# Patient Record
Sex: FEMALE | Race: WHITE | NOT HISPANIC OR LATINO | Employment: OTHER | URBAN - METROPOLITAN AREA
[De-identification: names, ages, dates, MRNs, and addresses within clinical notes are randomized per-mention and may not be internally consistent; named-entity substitution may affect disease eponyms.]

---

## 2023-03-29 PROBLEM — IMO0001 SMOKING: Status: ACTIVE | Noted: 2023-03-29

## 2024-04-08 RX ORDER — BISOPROLOL FUMARATE 5 MG/1
5 TABLET, FILM COATED ORAL DAILY
Qty: 30 TABLET | Refills: 0 | Status: SHIPPED | OUTPATIENT
Start: 2024-04-08

## 2024-04-17 ENCOUNTER — HOSPITAL ENCOUNTER (OUTPATIENT)
Dept: GENERAL RADIOLOGY | Facility: HOSPITAL | Age: 54
Discharge: HOME | End: 2024-04-17
Admitting: PHYSICIAN ASSISTANT
Payer: MEDICAID

## 2024-04-17 ENCOUNTER — OFFICE VISIT (OUTPATIENT)
Dept: NEUROSURGERY | Facility: CLINIC | Age: 54
End: 2024-04-17
Payer: MEDICAID

## 2024-04-17 VITALS
HEIGHT: 63 IN | SYSTOLIC BLOOD PRESSURE: 122 MMHG | WEIGHT: 217.4 LBS | BODY MASS INDEX: 38.52 KG/M2 | TEMPERATURE: 98.2 F | DIASTOLIC BLOOD PRESSURE: 76 MMHG

## 2024-04-17 DIAGNOSIS — M47.819 FACET ARTHROPATHY: ICD-10-CM

## 2024-04-17 DIAGNOSIS — M25.551 BILATERAL HIP PAIN: ICD-10-CM

## 2024-04-17 DIAGNOSIS — M43.10 SPONDYLOLISTHESIS, ACQUIRED: ICD-10-CM

## 2024-04-17 DIAGNOSIS — M43.10 SPONDYLOLISTHESIS, ACQUIRED: Primary | ICD-10-CM

## 2024-04-17 DIAGNOSIS — M51.36 DISC DEGENERATION, LUMBAR: ICD-10-CM

## 2024-04-17 DIAGNOSIS — M25.552 BILATERAL HIP PAIN: ICD-10-CM

## 2024-04-17 PROCEDURE — 73521 X-RAY EXAM HIPS BI 2 VIEWS: CPT

## 2024-04-17 PROCEDURE — 72120 X-RAY BEND ONLY L-S SPINE: CPT

## 2024-04-17 RX ORDER — CHOLECALCIFEROL TAB 125 MCG (5000 UNIT) 125 MCG (5000 UT)
1 TAB DAILY
COMMUNITY
Start: 2024-03-26

## 2024-04-17 RX ORDER — HYDROCODONE BITARTRATE AND ACETAMINOPHEN 5; 325 MG/1; MG/1
1 TABLET ORAL EVERY 12 HOURS SCHEDULED
COMMUNITY
Start: 2024-04-01

## 2024-04-17 RX ORDER — GABAPENTIN 300 MG/1
300 CAPSULE ORAL 3 TIMES DAILY
COMMUNITY

## 2024-04-17 RX ORDER — DULOXETIN HYDROCHLORIDE 20 MG/1
20 CAPSULE, DELAYED RELEASE ORAL
COMMUNITY
Start: 2024-04-12

## 2024-04-17 RX ORDER — ALBUTEROL SULFATE 90 UG/1
AEROSOL, METERED RESPIRATORY (INHALATION)
COMMUNITY
Start: 2024-04-16

## 2024-04-17 NOTE — PROGRESS NOTES
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 Her back and leg began bothering her in 2022.  She does not recall a specific event that initiated the pain but she has been unable to work since that time. Pain initially began in the left leg but now both legs hurt.  She describes low back pain that extends down the back of the legs often to the calf and ankle. Pain has been extended up her spine lately. She describes pain as debilitating. Symptoms are worse with sitting.  She has to frequently change positions to stay comfortable.  Walking often helps.  She was evaluated by neurosurgery in [place].  She has also been evaluated at [facility] orthopedic.  She has had chiropractic therapy, gabapentin and numerous injections at [facility].  Nothing has helped.  She denies focal weakness.  No bowel or bladder difficulties.      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    Hyperlipidemia 2023    Hypertension 2022    Low back pain     Lumbosacral disc disease     Sleep apnea 2023    Upcoming appt w/  to verify         Current Outpatient Medications:     amitriptyline (ELAVIL) 50 MG tablet, Take 1 tablet by mouth every night at bedtime., Disp: , Rfl:     aspirin (Aspirin 81) 81 MG chewable tablet, Chew 1 tablet Daily., Disp: 90 tablet, Rfl: 3    bisoprolol (ZEBeta) 5 MG tablet, TAKE 1 Tablet BY MOUTH ONCE DAILY, Disp: 30 tablet, Rfl: 0    clindamycin (CLEOCIN) 300 MG capsule, TAKE TWO CAPSULE ONE hour  prior TO appointment, Disp: , Rfl:     DULoxetine (CYMBALTA) 20 MG capsule, 1 capsule., Disp: , Rfl:     escitalopram (LEXAPRO) 20 MG tablet, Take 1 tablet by mouth Daily., Disp: , Rfl:     gabapentin (NEURONTIN) 300 MG capsule, Take 1 capsule by mouth 3 (Three) Times a Day., Disp: , Rfl:     HYDROcodone-acetaminophen (NORCO) 5-325 MG per tablet, Take 1 tablet by mouth Every 12 (Twelve) Hours., Disp: , Rfl:     lisinopril (PRINIVIL,ZESTRIL) 20 MG tablet, Take 1 tablet by mouth Daily., Disp: , Rfl:     Natural Vitamin D-3 125 MCG (5000 UT) tablet, Take 1 tablet by mouth Daily., Disp: , Rfl:     nicotine (Nicoderm CQ) 14 MG/24HR patch, Place 1 patch on the skin as directed by provider Daily., Disp: 30 patch, Rfl: 6    omeprazole (priLOSEC) 40 MG capsule, Take 1 capsule by mouth Daily., Disp: 30 capsule, Rfl: 1    polyethylene glycol (MiraLax) 17 GM/SCOOP powder, Use as directed. (Patient taking differently: Take 17 g by mouth Daily. Use as directed.), Disp: 238 g, Rfl: 0    rosuvastatin (CRESTOR) 20 MG tablet, Take 1 tablet by mouth Daily., Disp: 90 tablet, Rfl: 3    Ventolin  (90 Base) MCG/ACT inhaler, , Disp: , Rfl:     Past Surgical History:   Procedure Laterality Date    CARDIAC CATHETERIZATION  02/24/2023    50% RCA. EF 60%    CONVERTED (HISTORICAL) HOLTER  11/08/2023    < 14 Days. Avg 94. . Rare PAC & PVC. One SVT    EPIDURAL BLOCK      OTHER SURGICAL HISTORY  02/24/2023    US GB- Small GS Vs Polyp    TRIGGER POINT INJECTION         Social History     Socioeconomic History    Marital status:    Tobacco Use    Smoking status: Every Day     Current packs/day: 1.00     Average packs/day: 1 pack/day for 4.2 years (4.2 ttl pk-yrs)     Types: Cigarettes     Start date: 2/20/2020    Smokeless tobacco: Never   Vaping Use    Vaping status: Never Used   Substance and Sexual Activity    Alcohol use: Never    Drug use: Never    Sexual activity: Not Currently     Partners: Male     Birth  control/protection: Hysterectomy         Review of Systems   Constitutional:  Positive for activity change, fatigue and unexpected weight change. Negative for appetite change, chills, diaphoresis and fever.   HENT:  Negative for congestion, dental problem, drooling, ear discharge, ear pain, facial swelling, hearing loss, mouth sores, nosebleeds, postnasal drip, rhinorrhea, sinus pressure, sinus pain, sneezing, sore throat, tinnitus, trouble swallowing and voice change.    Eyes:  Negative for photophobia, pain, discharge, redness, itching and visual disturbance.   Respiratory:  Positive for apnea, cough and shortness of breath. Negative for choking, chest tightness, wheezing and stridor.    Cardiovascular:  Positive for palpitations. Negative for chest pain and leg swelling.   Gastrointestinal:  Positive for constipation. Negative for abdominal distention, abdominal pain, anal bleeding, blood in stool, diarrhea, nausea, rectal pain and vomiting.   Endocrine: Negative for cold intolerance, heat intolerance, polydipsia, polyphagia and polyuria.   Genitourinary:  Negative for decreased urine volume, difficulty urinating, dyspareunia, dysuria, enuresis, flank pain, frequency, genital sores, hematuria, menstrual problem, pelvic pain, urgency, vaginal bleeding, vaginal discharge and vaginal pain.   Musculoskeletal:  Positive for back pain and gait problem. Negative for arthralgias, joint swelling, myalgias, neck pain and neck stiffness.   Skin:  Negative for color change, pallor, rash and wound.   Allergic/Immunologic: Negative for environmental allergies, food allergies and immunocompromised state.   Neurological:  Positive for headaches. Negative for dizziness, tremors, seizures, syncope, facial asymmetry, speech difficulty, weakness, light-headedness and numbness.   Hematological:  Negative for adenopathy. Does not bruise/bleed easily.   Psychiatric/Behavioral:  Positive for decreased concentration and dysphoric mood.  "Negative for agitation, behavioral problems, confusion, hallucinations, self-injury, sleep disturbance and suicidal ideas. The patient is nervous/anxious. The patient is not hyperactive.        Objective   Vital Signs: Blood pressure 122/76, temperature 98.2 °F (36.8 °C), temperature source Infrared, height 160 cm (63\"), weight 98.6 kg (217 lb 6.4 oz).  Physical Exam  Vitals and nursing note reviewed.   Constitutional:       General: She is not in acute distress.     Appearance: She is well-developed.   HENT:      Head: Normocephalic and atraumatic.   Psychiatric:         Behavior: Behavior normal.         Thought Content: Thought content normal.     Musculoskeletal:     Strength is intact in upper and lower extremities to direct testing.     Station and gait are normal.     Pain in the leg and hip with any manipulation  Neurologic:     Muscle tone is normal throughout.     Coordination is intact.     Deep tendon reflexes: 1+ and symmetrical.     Sensation is intact to light touch throughout.     Patient is oriented to person, place, and time.         Independent review of radiographic imaging: MRI lumbar spine dated 12/27/2023 was reviewed.  This demonstrates a slight anterior listhesis of L4 on 5.  There is broad-based disc bulge as well as ligamentous thickening and facet joint arthropathy with mild canal stenosis.  No significant canal stenosis or foraminal narrowing at L5-S1.  There is a cyst in the posterior elements beyond the left facet.  I reviewed MRI with Dr. Solomon    Assessment & Plan   Diagnosis:  Lumbar degenerative disc and joint disease  Mechanical back pain    Medical Decision Making: Patient symptoms are a bit in excess of what I would expect from her imaging.  I have recommended flexion-extension plain films of the lumbar spine as well as hip radiographs.  She may go ahead and begin formal physical therapy.  I will see her back to review imaging and further recommendations will be made at that " time.    Diagnoses and all orders for this visit:    1. Spondylolisthesis, acquired (Primary)  -     XR Spine Lumbar Flex & Ext; Future  -     XR hip 1 vw bilateral; Future    2. Bilateral hip pain  -     XR Spine Lumbar Flex & Ext; Future  -     XR hip 1 vw bilateral; Future    3. Disc degeneration, lumbar  -     XR Spine Lumbar Flex & Ext; Future  -     XR hip 1 vw bilateral; Future    4. Facet arthropathy  -     XR Spine Lumbar Flex & Ext; Future  -     XR hip 1 vw bilateral; Future                            Treva Carter PA-C  Patient Care Team:  Ivania Aquino APRN as PCP - General (Family Medicine)

## 2024-05-01 ENCOUNTER — OFFICE VISIT (OUTPATIENT)
Dept: NEUROSURGERY | Facility: CLINIC | Age: 54
End: 2024-05-01
Payer: MEDICAID

## 2024-05-01 VITALS
WEIGHT: 217.3 LBS | BODY MASS INDEX: 38.5 KG/M2 | HEIGHT: 63 IN | SYSTOLIC BLOOD PRESSURE: 120 MMHG | DIASTOLIC BLOOD PRESSURE: 72 MMHG | TEMPERATURE: 98 F

## 2024-05-01 DIAGNOSIS — M25.551 BILATERAL HIP PAIN: ICD-10-CM

## 2024-05-01 DIAGNOSIS — M47.819 FACET ARTHROPATHY: ICD-10-CM

## 2024-05-01 DIAGNOSIS — M25.552 BILATERAL HIP PAIN: ICD-10-CM

## 2024-05-01 DIAGNOSIS — M51.36 DISC DEGENERATION, LUMBAR: ICD-10-CM

## 2024-05-01 DIAGNOSIS — M43.10 SPONDYLOLISTHESIS, ACQUIRED: Primary | ICD-10-CM

## 2024-05-01 RX ORDER — ACYCLOVIR 800 MG/1
TABLET ORAL
COMMUNITY

## 2024-05-01 RX ORDER — BISACODYL 5 MG/1
TABLET, DELAYED RELEASE ORAL
COMMUNITY

## 2024-05-01 NOTE — PROGRESS NOTES
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  She does not recall a specific event that initiated the pain but she has been unable to work since that time. Pain initially began in the left leg but now both legs hurt.  She describes low back pain that extends down the back of the legs often to the calf and ankle. Pain has been extended up her spine lately. She describes pain as debilitating. Symptoms are worse with sitting.  She has to frequently change positions to stay comfortable.  Walking often helps.  She was evaluated by neurosurgery in [place].  She has also been evaluated at [facility] orthopedic.  She has had chiropractic therapy, gabapentin and numerous injections at [facility].  Nothing has helped.  She denies focal weakness.  No bowel or bladder difficulties.      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    Hyperlipidemia 2023    Hypertension 2022    Low back pain     Lumbosacral disc disease     Sleep apnea 2023    Upcoming appt w/  to verify         Current Outpatient Medications:     acyclovir (ZOVIRAX) 800 MG tablet, take 1 tablet by mouth once daily for 10 days, Disp: , Rfl:     amitriptyline (ELAVIL) 50 MG tablet, Take 1 tablet by mouth every night at bedtime., Disp: , Rfl:     aspirin (Aspirin 81) 81 MG chewable tablet, Chew 1 tablet Daily., Disp: 90 tablet, Rfl: 3    bisacodyl (DULCOLAX) 5 MG EC tablet, TAKE ONE TABLET AS DIRECTED,  Disp: , Rfl:     bisoprolol (ZEBeta) 5 MG tablet, TAKE 1 Tablet BY MOUTH ONCE DAILY, Disp: 30 tablet, Rfl: 0    clindamycin (CLEOCIN) 300 MG capsule, TAKE TWO CAPSULE ONE hour prior TO appointment, Disp: , Rfl:     DULoxetine (CYMBALTA) 20 MG capsule, 1 capsule., Disp: , Rfl:     escitalopram (LEXAPRO) 20 MG tablet, Take 1 tablet by mouth Daily., Disp: , Rfl:     gabapentin (NEURONTIN) 300 MG capsule, Take 1 capsule by mouth 3 (Three) Times a Day., Disp: , Rfl:     HYDROcodone-acetaminophen (NORCO) 5-325 MG per tablet, Take 1 tablet by mouth Every 12 (Twelve) Hours., Disp: , Rfl:     lisinopril (PRINIVIL,ZESTRIL) 20 MG tablet, Take 1 tablet by mouth Daily., Disp: , Rfl:     Natural Vitamin D-3 125 MCG (5000 UT) tablet, Take 1 tablet by mouth Daily., Disp: , Rfl:     nicotine (Nicoderm CQ) 14 MG/24HR patch, Place 1 patch on the skin as directed by provider Daily., Disp: 30 patch, Rfl: 6    omeprazole (priLOSEC) 40 MG capsule, Take 1 capsule by mouth Daily., Disp: 30 capsule, Rfl: 1    polyethylene glycol (MiraLax) 17 GM/SCOOP powder, Use as directed. (Patient taking differently: Take 17 g by mouth Daily. Use as directed.), Disp: 238 g, Rfl: 0    rosuvastatin (CRESTOR) 20 MG tablet, Take 1 tablet by mouth Daily., Disp: 90 tablet, Rfl: 3    Ventolin  (90 Base) MCG/ACT inhaler, , Disp: , Rfl:     Past Surgical History:   Procedure Laterality Date    CARDIAC CATHETERIZATION  02/24/2023    50% RCA. EF 60%    CONVERTED (HISTORICAL) HOLTER  11/08/2023    < 14 Days. Avg 94. . Rare PAC & PVC. One SVT    EPIDURAL BLOCK      OTHER SURGICAL HISTORY  02/24/2023    US GB- Small GS Vs Polyp    TRIGGER POINT INJECTION         Social History     Socioeconomic History    Marital status:    Tobacco Use    Smoking status: Every Day     Current packs/day: 1.00     Average packs/day: 1 pack/day for 4.2 years (4.2 ttl pk-yrs)     Types: Cigarettes     Start date: 2/20/2020    Smokeless tobacco: Never   Vaping Use     Vaping status: Never Used   Substance and Sexual Activity    Alcohol use: Never    Drug use: Never    Sexual activity: Not Currently     Partners: Male     Birth control/protection: Hysterectomy         Review of Systems   Constitutional:  Positive for activity change, fatigue and unexpected weight change. Negative for appetite change, chills, diaphoresis and fever.   HENT:  Negative for congestion, dental problem, drooling, ear discharge, ear pain, facial swelling, hearing loss, mouth sores, nosebleeds, postnasal drip, rhinorrhea, sinus pressure, sinus pain, sneezing, sore throat, tinnitus, trouble swallowing and voice change.    Eyes:  Negative for photophobia, pain, discharge, redness, itching and visual disturbance.   Respiratory:  Positive for apnea, cough and shortness of breath. Negative for choking, chest tightness, wheezing and stridor.    Cardiovascular:  Positive for palpitations. Negative for chest pain and leg swelling.   Gastrointestinal:  Positive for constipation. Negative for abdominal distention, abdominal pain, anal bleeding, blood in stool, diarrhea, nausea, rectal pain and vomiting.   Endocrine: Negative for cold intolerance, heat intolerance, polydipsia, polyphagia and polyuria.   Genitourinary:  Negative for decreased urine volume, difficulty urinating, dyspareunia, dysuria, enuresis, flank pain, frequency, genital sores, hematuria, menstrual problem, pelvic pain, urgency, vaginal bleeding, vaginal discharge and vaginal pain.   Musculoskeletal:  Positive for back pain and gait problem. Negative for arthralgias, joint swelling, myalgias, neck pain and neck stiffness.   Skin:  Negative for color change, pallor, rash and wound.   Allergic/Immunologic: Negative for environmental allergies, food allergies and immunocompromised state.   Neurological:  Positive for headaches. Negative for dizziness, tremors, seizures, syncope, facial asymmetry, speech difficulty, weakness, light-headedness and  "numbness.   Hematological:  Negative for adenopathy. Does not bruise/bleed easily.   Psychiatric/Behavioral:  Positive for decreased concentration and dysphoric mood. Negative for agitation, behavioral problems, confusion, hallucinations, self-injury, sleep disturbance and suicidal ideas. The patient is nervous/anxious. The patient is not hyperactive.        Objective   Vital Signs: Blood pressure 120/72, temperature 98 °F (36.7 °C), temperature source Infrared, height 160 cm (63\"), weight 98.6 kg (217 lb 4.8 oz).  Physical Exam  Vitals and nursing note reviewed.   Constitutional:       General: She is not in acute distress.     Appearance: She is well-developed.   HENT:      Head: Normocephalic and atraumatic.   Psychiatric:         Behavior: Behavior normal.         Thought Content: Thought content normal.     Musculoskeletal:     Strength is intact in upper and lower extremities to direct testing.     Station and gait are normal.     Pain in the leg and hip with any manipulation  Neurologic:     Muscle tone is normal throughout.     Coordination is intact.     Deep tendon reflexes: 1+ and symmetrical.     Sensation is intact to light touch throughout.     Patient is oriented to person, place, and time.         Independent review of radiographic imaging: MRI lumbar spine dated 12/27/2023 was reviewed.  This demonstrates a slight anterior listhesis of L4 on 5.  There is broad-based disc bulge as well as ligamentous thickening and facet joint arthropathy with mild canal stenosis.  No significant canal stenosis or foraminal narrowing at L5-S1.  There is a cyst in the posterior elements beyond the left facet.  I reviewed MRI with Dr. Solomon    No instability on flexion extension films.   Mild degenerative changes in the hips.    Assessment & Plan   Diagnosis:  Lumbar degenerative disc and joint disease  Mechanical back pain    Medical Decision Making: I reviewed films with Dr. Solomon.  In the absence of consistent " clear-cut radicular complaints, he does not recommend surgical intervention at this time.  Patient will need to continue with pain management.  She has had some improvement with gabapentin.  She may benefit from an increase in dose.  If symptoms change I will be happy to see her as needed      Diagnoses and all orders for this visit:    1. Spondylolisthesis, acquired (Primary)    2. Bilateral hip pain    3. Disc degeneration, lumbar    4. Facet arthropathy                              Treva Carter PA-C  Patient Care Team:  Ivania Aquino APRN as PCP - General (Family Medicine)

## 2024-05-16 ENCOUNTER — OFFICE VISIT (OUTPATIENT)
Dept: CARDIOLOGY | Facility: CLINIC | Age: 54
End: 2024-05-16
Payer: MEDICAID

## 2024-05-16 VITALS
HEART RATE: 72 BPM | BODY MASS INDEX: 39.16 KG/M2 | DIASTOLIC BLOOD PRESSURE: 68 MMHG | WEIGHT: 221 LBS | SYSTOLIC BLOOD PRESSURE: 110 MMHG | HEIGHT: 63 IN

## 2024-05-16 DIAGNOSIS — I10 PRIMARY HYPERTENSION: Primary | ICD-10-CM

## 2024-05-16 DIAGNOSIS — R00.2 PALPITATIONS: ICD-10-CM

## 2024-05-16 DIAGNOSIS — E55.9 VITAMIN D DEFICIENCY: ICD-10-CM

## 2024-05-16 DIAGNOSIS — E78.00 HYPERCHOLESTEREMIA: ICD-10-CM

## 2024-05-16 RX ORDER — ASPIRIN 81 MG/1
81 TABLET, CHEWABLE ORAL DAILY
Qty: 90 TABLET | Refills: 3 | Status: SHIPPED | OUTPATIENT
Start: 2024-05-16

## 2024-05-16 RX ORDER — ROSUVASTATIN CALCIUM 20 MG/1
20 TABLET, COATED ORAL DAILY
Qty: 90 TABLET | Refills: 3 | Status: SHIPPED | OUTPATIENT
Start: 2024-05-16

## 2024-05-16 RX ORDER — LISINOPRIL 20 MG/1
20 TABLET ORAL DAILY
Qty: 90 TABLET | Refills: 3 | Status: SHIPPED | OUTPATIENT
Start: 2024-05-16

## 2024-05-16 RX ORDER — BISOPROLOL FUMARATE 5 MG/1
2.5 TABLET, FILM COATED ORAL DAILY
Qty: 90 TABLET | Refills: 3 | Status: SHIPPED | OUTPATIENT
Start: 2024-05-16

## 2024-05-16 RX ORDER — CHOLECALCIFEROL TAB 125 MCG (5000 UNIT) 125 MCG (5000 UT)
1 TAB DAILY
Qty: 30 TABLET | Refills: 6 | Status: SHIPPED | OUTPATIENT
Start: 2024-05-16

## 2024-05-16 NOTE — PROGRESS NOTES
"Chief Complaint   Patient presents with    Follow-up     Cardiac management    Lab     Last labs in chart.    Rapid Heart Rate     Has been waking from a nap daily with heart racing. States \"feels like my heart is beating out of my chest\". She has not been wearing CPAP when she lays down during the day.     Sleep apnea      Wears CPAP at night, follows with Dr Barillas.    Medication     She was only able to tolerate Bisoprolol 5 mg 1/2 tablet daily.    Med Refill     Will need refills on cardiac medications-90 day        HPI:  GIANCARLO Barraza is a 53 y.o. female with HTN, hypercholesterolemia who was admitted in February 2023 with severe chest pain radiating to shoulder.  Troponin elevated, underwent cardiac cath found to have moderate disease of the RCA.  , started statin and aspirin, lisinopril continued.  She was diagnosed with sleep apnea, now wearing CPAP.     She returns today for follow-up.  Her Holter monitor 10/2023 which showed short run of SVT 4 beatsThe rate of 126 bpm no VT no significant pauses and was prescribed bisoprolol 5 mg daily but starting with 1/2 tablet and increasing to 1 whole tablet if tolerated.  Patient states it was not tolerated and she is stayed on 1/2 tablet daily. Patient denies chest pain, pressure, tightness, dizziness, shortness of air. She reports palpitations when she wakes from naps during the day. Feels her heart beating out of chest which she used to feel at night before starting CPAP. She no longer feels it at night and does not wear CPAP during day naps. She follows with Dr. Hughes, pulmonology. Requesting cardiac medication refills.  Last labs in chart 10/24/2023 total cholesterol 133, triglycerides 100, HDL 58, LDL 57,Normal CBC, creatinine 0.53, .8, vitamin D 19.1, TSH 3.32 magnesium 2.1, A1c 5.5.    Cardiac History:    Past Surgical History:   Procedure Laterality Date    CARDIAC CATHETERIZATION  02/24/2023    50% RCA. EF 60%    CONVERTED " (HISTORICAL) HOLTER  11/08/2023    < 14 Days. Avg 94. . Rare PAC & PVC. One SVT    EPIDURAL BLOCK      OTHER SURGICAL HISTORY  02/24/2023    US GB- Small GS Vs Polyp    TRIGGER POINT INJECTION         Current Outpatient Medications   Medication Sig Dispense Refill    acyclovir (ZOVIRAX) 800 MG tablet Take 1 tablet by mouth Daily.      amitriptyline (ELAVIL) 50 MG tablet Take 1 tablet by mouth every night at bedtime.      aspirin (Aspirin 81) 81 MG chewable tablet Chew 1 tablet Daily. 90 tablet 3    bisoprolol (ZEBeta) 5 MG tablet Take 0.5 tablets by mouth Daily. 90 tablet 3    clindamycin (CLEOCIN) 300 MG capsule TAKE TWO CAPSULE ONE hour prior TO appointment      DULoxetine (CYMBALTA) 20 MG capsule 1 capsule. Twice weekly for 3 weeks      escitalopram (LEXAPRO) 20 MG tablet Take 1 tablet by mouth Daily.      gabapentin (NEURONTIN) 300 MG capsule Take 1 capsule by mouth 3 (Three) Times a Day.      HYDROcodone-acetaminophen (NORCO) 5-325 MG per tablet Take 1 tablet by mouth Every 12 (Twelve) Hours.      lisinopril (PRINIVIL,ZESTRIL) 20 MG tablet Take 1 tablet by mouth Daily. 90 tablet 3    Natural Vitamin D-3 125 MCG (5000 UT) tablet Take 1 tablet by mouth Daily. 30 tablet 6    nicotine (Nicoderm CQ) 14 MG/24HR patch Place 1 patch on the skin as directed by provider Daily. (Patient taking differently: Place 1 patch on the skin as directed by provider Daily. As needed) 30 patch 6    omeprazole (priLOSEC) 40 MG capsule Take 1 capsule by mouth Daily. 30 capsule 1    polyethylene glycol (MiraLax) 17 GM/SCOOP powder Use as directed. (Patient taking differently: Take 17 g by mouth Daily. Use as directed.) 238 g 0    rosuvastatin (CRESTOR) 20 MG tablet Take 1 tablet by mouth Daily. 90 tablet 3    Ventolin  (90 Base) MCG/ACT inhaler 2 puffs Every 4 (Four) Hours As Needed.       No current facility-administered medications for this visit.       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    Hyperlipidemia 02/23/2023    Hypertension 12/2022    Low back pain     Lumbosacral disc disease     Sleep apnea 02/23/2023    Upcoming appt w/  to verify       Social History     Socioeconomic History    Marital status:    Tobacco Use    Smoking status: Every Day     Current packs/day: 1.00     Average packs/day: 1 pack/day for 4.3 years (4.3 ttl pk-yrs)     Types: Cigarettes     Start date: 2/20/2020    Smokeless tobacco: Never   Vaping Use    Vaping status: Never Used   Substance and Sexual Activity    Alcohol use: Never    Drug use: Never    Sexual activity: Not Currently     Partners: Male     Birth control/protection: Hysterectomy       Family History   Problem Relation Age of Onset    Hypertension Mother     Heart attack Father     Hypertension Father     Alcohol abuse Father     Diabetes Father     Heart disease Father     Other Father         Buerger’s Disease    Hypertension Maternal Grandmother     Arthritis Maternal Grandmother     Stroke Maternal Grandmother     Heart attack Maternal Grandfather     Hypertension Maternal Grandfather     Diabetes Maternal Grandfather     Heart disease Maternal Grandfather     No Known Problems Son     No Known Problems Son     No Known Problems Daughter     COPD Paternal Grandmother        Vitals:   /68 (BP Location: Right arm)   Pulse 72   Ht 160 cm (62.99\")   Wt 100 kg (221 lb)   BMI 39.16 kg/m²     Physical Exam  Vitals and nursing note reviewed.   Constitutional:       Appearance: She is obese.   Neck:      Vascular: No carotid bruit.   Cardiovascular:      Rate and Rhythm: Normal rate and regular rhythm.      Pulses: Normal pulses.      Heart sounds: Normal heart " sounds. No murmur heard.     No friction rub. No gallop.   Pulmonary:      Effort: Pulmonary effort is normal.      Breath sounds: Normal breath sounds and air entry.   Musculoskeletal:      Right lower leg: No edema.      Left lower leg: No edema.   Skin:     General: Skin is warm and dry.      Capillary Refill: Capillary refill takes less than 2 seconds.   Neurological:      Mental Status: She is alert and oriented to person, place, and time.       Procedures     Assessment & Plan     Diagnoses and all orders for this visit:    1. Primary hypertension (Primary)  -     lisinopril (PRINIVIL,ZESTRIL) 20 MG tablet; Take 1 tablet by mouth Daily.  Dispense: 90 tablet; Refill: 3  -     aspirin (Aspirin 81) 81 MG chewable tablet; Chew 1 tablet Daily.  Dispense: 90 tablet; Refill: 3    2. Hypercholesteremia  -     rosuvastatin (CRESTOR) 20 MG tablet; Take 1 tablet by mouth Daily.  Dispense: 90 tablet; Refill: 3  -     aspirin (Aspirin 81) 81 MG chewable tablet; Chew 1 tablet Daily.  Dispense: 90 tablet; Refill: 3    3. Palpitations  -     bisoprolol (ZEBeta) 5 MG tablet; Take 0.5 tablets by mouth Daily.  Dispense: 90 tablet; Refill: 3    4. Vitamin D deficiency  -     Natural Vitamin D-3 125 MCG (5000 UT) tablet; Take 1 tablet by mouth Daily.  Dispense: 30 tablet; Refill: 6    HTN  - BP well-controlled  - Continue with current medication regimen including bisoprolol 5 mg, lisinopril 20 mg    Hypercholesteremia  - Lipids controlled  - Continue current statin therapy; rosuvastatin 20 mg along with diet and exercise    Palpitations  - Recommend CPAP anytime sleeping.   - Take extra 1/2 tab bisoprolol when feel heart racing.    Vitamin D deficiency  - Last vitamin D in chart 19.1  - Continue vitamin D3 5000 units daily    No further testing at this time. Refills sent.  Discussed lifestyle modifications, nutrition. Clean whole foods. And exercise such as walking for weight loss.     Visit Diagnoses:    ICD-10-CM ICD-9-CM   1.  Primary hypertension  I10 401.9   2. Hypercholesteremia  E78.00 272.0   3. Palpitations  R00.2 785.1   4. Vitamin D deficiency  E55.9 268.9       Meds Ordered During Visit:  New Medications Ordered This Visit   Medications    bisoprolol (ZEBeta) 5 MG tablet     Sig: Take 0.5 tablets by mouth Daily.     Dispense:  90 tablet     Refill:  3     This prescription was filled on 3/26/2024. Any refills authorized will be placed on file.    lisinopril (PRINIVIL,ZESTRIL) 20 MG tablet     Sig: Take 1 tablet by mouth Daily.     Dispense:  90 tablet     Refill:  3    rosuvastatin (CRESTOR) 20 MG tablet     Sig: Take 1 tablet by mouth Daily.     Dispense:  90 tablet     Refill:  3    Natural Vitamin D-3 125 MCG (5000 UT) tablet     Sig: Take 1 tablet by mouth Daily.     Dispense:  30 tablet     Refill:  6    aspirin (Aspirin 81) 81 MG chewable tablet     Sig: Chew 1 tablet Daily.     Dispense:  90 tablet     Refill:  3       Follow Up Appointment:   Return in about 6 months (around 11/16/2024), or if symptoms worsen or fail to improve.           This document has been electronically signed by SUSAN Michael  May 22, 2024 08:29 EDT    Dictated Utilizing Dragon Dictation: Part of this note may be an electronic transcription/translation of spoken language to printed text using the Dragon Dictation System.

## 2024-05-21 ENCOUNTER — TELEPHONE (OUTPATIENT)
Dept: CARDIOLOGY | Facility: CLINIC | Age: 54
End: 2024-05-21
Payer: MEDICAID

## 2024-05-21 NOTE — TELEPHONE ENCOUNTER
Caller: [facility]    Relationship:     Best call back number: [phone] EXT [phone]    What form or medical record are you requestin2024 OFFICE NOTE    Who is requesting this form or medical record from you: JSOEPH LUU    How would you like to receive the form or medical records (pick-up, mail, fax): FAX  If fax, what is the fax number: [phone]  If mail, what is the address:   If pick-up, provide patient with address and location details    Timeframe paperwork needed:     Additional notes:

## 2024-06-25 ENCOUNTER — TELEPHONE (OUTPATIENT)
Dept: CARDIOLOGY | Facility: CLINIC | Age: 54
End: 2024-06-25
Payer: MEDICAID

## 2024-06-25 NOTE — TELEPHONE ENCOUNTER
Patient was advised at the last visit that she should have her A1C checked by her primary care but they do not want to order the test. Was wondering if we could order that test for her.  Please contact at your next convenience. Thank you in advance.

## 2024-06-26 NOTE — TELEPHONE ENCOUNTER
The last A1c I have on file is from October 2022 and it was 5.5.  This is a normal A1c.  I do not manage diabetes.  That is something that the PCP manages.  I would say they will order an A1c in the appropriate timeline according to guidelines

## 2024-06-27 NOTE — TELEPHONE ENCOUNTER
Patient was made aware to check with PCP about guidelines around ordering A1C since last was normal.

## 2024-11-25 ENCOUNTER — OFFICE VISIT (OUTPATIENT)
Dept: CARDIOLOGY | Facility: CLINIC | Age: 54
End: 2024-11-25
Payer: MEDICAID

## 2024-11-25 VITALS
HEIGHT: 63 IN | HEART RATE: 68 BPM | SYSTOLIC BLOOD PRESSURE: 130 MMHG | WEIGHT: 208 LBS | DIASTOLIC BLOOD PRESSURE: 80 MMHG | BODY MASS INDEX: 36.86 KG/M2

## 2024-11-25 DIAGNOSIS — E78.00 HYPERCHOLESTEREMIA: ICD-10-CM

## 2024-11-25 DIAGNOSIS — E88.810 METABOLIC SYNDROME: ICD-10-CM

## 2024-11-25 DIAGNOSIS — R73.01 ELEVATED FASTING GLUCOSE: ICD-10-CM

## 2024-11-25 DIAGNOSIS — E55.9 VITAMIN D DEFICIENCY: ICD-10-CM

## 2024-11-25 DIAGNOSIS — R00.2 PALPITATIONS: ICD-10-CM

## 2024-11-25 DIAGNOSIS — I10 PRIMARY HYPERTENSION: Primary | ICD-10-CM

## 2024-11-25 DIAGNOSIS — F17.200 SMOKING: ICD-10-CM

## 2024-11-25 PROCEDURE — 99214 OFFICE O/P EST MOD 30 MIN: CPT | Performed by: NURSE PRACTITIONER

## 2024-11-25 PROCEDURE — 3079F DIAST BP 80-89 MM HG: CPT | Performed by: NURSE PRACTITIONER

## 2024-11-25 PROCEDURE — 3075F SYST BP GE 130 - 139MM HG: CPT | Performed by: NURSE PRACTITIONER

## 2024-11-25 RX ORDER — POLYETHYLENE GLYCOL 3350 17 G/17G
17 POWDER, FOR SOLUTION ORAL DAILY
Start: 2024-11-25

## 2024-11-25 RX ORDER — NICOTINE 21 MG/24HR
1 PATCH, TRANSDERMAL 24 HOURS TRANSDERMAL EVERY 24 HOURS
Start: 2024-11-25

## 2024-11-25 RX ORDER — MELOXICAM 15 MG/1
15 TABLET ORAL DAILY
COMMUNITY

## 2024-11-25 RX ORDER — CHOLECALCIFEROL TAB 125 MCG (5000 UNIT) 125 MCG (5000 UT)
1 TAB DAILY
Qty: 30 TABLET | Refills: 6 | Status: SHIPPED | OUTPATIENT
Start: 2024-11-25
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
## 2025-02-24 DIAGNOSIS — F17.200 SMOKING: ICD-10-CM

## 2025-02-24 NOTE — TELEPHONE ENCOUNTER
Patient called, requesting if can have a refill on the Nicotine patch to [place] [facility].    Patient reports she will also get her labs completed you ordered at last follow up visit.

## 2025-02-26 RX ORDER — NICOTINE 21 MG/24HR
1 PATCH, TRANSDERMAL 24 HOURS TRANSDERMAL EVERY 24 HOURS
Qty: 30 PATCH | Refills: 1 | Status: SHIPPED | OUTPATIENT
Start: 2025-02-26

## 2025-04-18 DIAGNOSIS — F17.200 NICOTINE DEPENDENCE, UNSPECIFIED, UNCOMPLICATED: ICD-10-CM

## 2025-04-18 RX ORDER — NICOTINE 21 MG/24HR
PATCH, TRANSDERMAL 24 HOURS TRANSDERMAL
Qty: 30 PATCH | Refills: 1 | Status: SHIPPED | OUTPATIENT
Start: 2025-04-18

## 2025-06-02 ENCOUNTER — LAB (OUTPATIENT)
Dept: LAB | Facility: HOSPITAL | Age: 55
End: 2025-06-02
Payer: COMMERCIAL

## 2025-06-02 DIAGNOSIS — E78.00 HYPERCHOLESTEREMIA: ICD-10-CM

## 2025-06-02 DIAGNOSIS — R00.2 PALPITATIONS: ICD-10-CM

## 2025-06-02 LAB
CHOLEST SERPL-MCNC: 111 MG/DL (ref 0–200)
HDLC SERPL-MCNC: 42 MG/DL (ref 40–60)
LDLC SERPL CALC-MCNC: 46 MG/DL (ref 0–100)
LDLC/HDLC SERPL: 1.02 {RATIO}
TRIGL SERPL-MCNC: 130 MG/DL (ref 0–150)
VLDLC SERPL-MCNC: 23 MG/DL (ref 5–40)

## 2025-06-02 PROCEDURE — 36415 COLL VENOUS BLD VENIPUNCTURE: CPT

## 2025-06-02 PROCEDURE — 80061 LIPID PANEL: CPT

## 2025-06-02 RX ORDER — BISOPROLOL FUMARATE 5 MG/1
2.5 TABLET, FILM COATED ORAL DAILY
Qty: 90 TABLET | Refills: 3 | Status: SHIPPED | OUTPATIENT
Start: 2025-06-02 | End: 2025-06-05 | Stop reason: SDUPTHER

## 2025-06-04 ENCOUNTER — LAB (OUTPATIENT)
Dept: LAB | Facility: HOSPITAL | Age: 55
End: 2025-06-04
Payer: COMMERCIAL

## 2025-06-04 DIAGNOSIS — E55.9 VITAMIN D DEFICIENCY: ICD-10-CM

## 2025-06-04 DIAGNOSIS — I10 PRIMARY HYPERTENSION: ICD-10-CM

## 2025-06-04 DIAGNOSIS — R00.2 PALPITATIONS: ICD-10-CM

## 2025-06-04 DIAGNOSIS — R73.01 ELEVATED FASTING GLUCOSE: ICD-10-CM

## 2025-06-04 DIAGNOSIS — E88.810 METABOLIC SYNDROME: ICD-10-CM

## 2025-06-04 DIAGNOSIS — E78.00 HYPERCHOLESTEREMIA: ICD-10-CM

## 2025-06-04 LAB
ALBUMIN SERPL-MCNC: 4.3 G/DL (ref 3.5–5.2)
ALBUMIN/GLOB SERPL: 1.9 G/DL
ALP SERPL-CCNC: 51 U/L (ref 39–117)
ALT SERPL W P-5'-P-CCNC: 17 U/L (ref 1–33)
ANION GAP SERPL CALCULATED.3IONS-SCNC: 10.4 MMOL/L (ref 5–15)
AST SERPL-CCNC: 17 U/L (ref 1–32)
BASOPHILS # BLD AUTO: 0.07 10*3/MM3 (ref 0–0.2)
BASOPHILS NFR BLD AUTO: 0.9 % (ref 0–1.5)
BILIRUB SERPL-MCNC: 0.4 MG/DL (ref 0–1.2)
BUN SERPL-MCNC: 7.9 MG/DL (ref 6–20)
BUN/CREAT SERPL: 15.8 (ref 7–25)
CALCIUM SPEC-SCNC: 9.2 MG/DL (ref 8.6–10.5)
CHLORIDE SERPL-SCNC: 104 MMOL/L (ref 98–107)
CO2 SERPL-SCNC: 25.6 MMOL/L (ref 22–29)
CREAT SERPL-MCNC: 0.5 MG/DL (ref 0.57–1)
DEPRECATED RDW RBC AUTO: 49.1 FL (ref 37–54)
EGFRCR SERPLBLD CKD-EPI 2021: 113 ML/MIN/1.73
EOSINOPHIL # BLD AUTO: 0.16 10*3/MM3 (ref 0–0.4)
EOSINOPHIL NFR BLD AUTO: 2 % (ref 0.3–6.2)
ERYTHROCYTE [DISTWIDTH] IN BLOOD BY AUTOMATED COUNT: 13.7 % (ref 12.3–15.4)
GLOBULIN UR ELPH-MCNC: 2.3 GM/DL
GLUCOSE SERPL-MCNC: 94 MG/DL (ref 65–99)
HBA1C MFR BLD: 5.93 % (ref 4.8–5.6)
HCT VFR BLD AUTO: 42.8 % (ref 34–46.6)
HGB BLD-MCNC: 14.1 G/DL (ref 12–15.9)
IMM GRANULOCYTES # BLD AUTO: 0.03 10*3/MM3 (ref 0–0.05)
IMM GRANULOCYTES NFR BLD AUTO: 0.4 % (ref 0–0.5)
LYMPHOCYTES # BLD AUTO: 2.65 10*3/MM3 (ref 0.7–3.1)
LYMPHOCYTES NFR BLD AUTO: 32.5 % (ref 19.6–45.3)
MAGNESIUM SERPL-MCNC: 1.8 MG/DL (ref 1.6–2.6)
MCH RBC QN AUTO: 31.8 PG (ref 26.6–33)
MCHC RBC AUTO-ENTMCNC: 32.9 G/DL (ref 31.5–35.7)
MCV RBC AUTO: 96.4 FL (ref 79–97)
MONOCYTES # BLD AUTO: 0.68 10*3/MM3 (ref 0.1–0.9)
MONOCYTES NFR BLD AUTO: 8.3 % (ref 5–12)
NEUTROPHILS NFR BLD AUTO: 4.57 10*3/MM3 (ref 1.7–7)
NEUTROPHILS NFR BLD AUTO: 55.9 % (ref 42.7–76)
NRBC BLD AUTO-RTO: 0 /100 WBC (ref 0–0.2)
PLATELET # BLD AUTO: 274 10*3/MM3 (ref 140–450)
PMV BLD AUTO: 10.7 FL (ref 6–12)
POTASSIUM SERPL-SCNC: 3.4 MMOL/L (ref 3.5–5.2)
PROT SERPL-MCNC: 6.6 G/DL (ref 6–8.5)
RBC # BLD AUTO: 4.44 10*6/MM3 (ref 3.77–5.28)
SODIUM SERPL-SCNC: 140 MMOL/L (ref 136–145)
TSH SERPL DL<=0.05 MIU/L-ACNC: 2.79 UIU/ML (ref 0.27–4.2)
WBC NRBC COR # BLD AUTO: 8.16 10*3/MM3 (ref 3.4–10.8)

## 2025-06-04 PROCEDURE — 83735 ASSAY OF MAGNESIUM: CPT

## 2025-06-04 PROCEDURE — 80050 GENERAL HEALTH PANEL: CPT

## 2025-06-04 PROCEDURE — 83527 ASSAY OF INSULIN: CPT

## 2025-06-04 PROCEDURE — 83525 ASSAY OF INSULIN: CPT

## 2025-06-04 PROCEDURE — 82306 VITAMIN D 25 HYDROXY: CPT

## 2025-06-04 PROCEDURE — 83036 HEMOGLOBIN GLYCOSYLATED A1C: CPT

## 2025-06-04 PROCEDURE — 36415 COLL VENOUS BLD VENIPUNCTURE: CPT

## 2025-06-05 ENCOUNTER — OFFICE VISIT (OUTPATIENT)
Dept: CARDIOLOGY | Facility: CLINIC | Age: 55
End: 2025-06-05
Payer: COMMERCIAL

## 2025-06-05 VITALS
WEIGHT: 201.6 LBS | DIASTOLIC BLOOD PRESSURE: 80 MMHG | OXYGEN SATURATION: 95 % | BODY MASS INDEX: 35.72 KG/M2 | HEIGHT: 63 IN | SYSTOLIC BLOOD PRESSURE: 120 MMHG | HEART RATE: 63 BPM

## 2025-06-05 DIAGNOSIS — R06.02 SHORTNESS OF BREATH: Primary | ICD-10-CM

## 2025-06-05 DIAGNOSIS — E55.9 VITAMIN D DEFICIENCY: ICD-10-CM

## 2025-06-05 DIAGNOSIS — E78.00 HYPERCHOLESTEREMIA: ICD-10-CM

## 2025-06-05 DIAGNOSIS — I10 PRIMARY HYPERTENSION: ICD-10-CM

## 2025-06-05 DIAGNOSIS — R00.2 PALPITATIONS: ICD-10-CM

## 2025-06-05 DIAGNOSIS — F17.200 NICOTINE DEPENDENCE, UNSPECIFIED, UNCOMPLICATED: ICD-10-CM

## 2025-06-05 DIAGNOSIS — R07.89 CHEST PRESSURE: ICD-10-CM

## 2025-06-05 LAB — 25(OH)D3 SERPL-MCNC: 59.7 NG/ML (ref 30–100)

## 2025-06-05 PROCEDURE — 99214 OFFICE O/P EST MOD 30 MIN: CPT | Performed by: NURSE PRACTITIONER

## 2025-06-05 PROCEDURE — 3079F DIAST BP 80-89 MM HG: CPT | Performed by: NURSE PRACTITIONER

## 2025-06-05 PROCEDURE — 3074F SYST BP LT 130 MM HG: CPT | Performed by: NURSE PRACTITIONER

## 2025-06-05 RX ORDER — ASPIRIN 81 MG/1
81 TABLET, CHEWABLE ORAL DAILY
Qty: 90 TABLET | Refills: 3 | Status: SHIPPED | OUTPATIENT
Start: 2025-06-05

## 2025-06-05 RX ORDER — LISINOPRIL AND HYDROCHLOROTHIAZIDE 12.5; 2 MG/1; MG/1
1 TABLET ORAL DAILY
COMMUNITY
Start: 2025-05-19

## 2025-06-05 RX ORDER — NICOTINE 21 MG/24HR
1 PATCH, TRANSDERMAL 24 HOURS TRANSDERMAL EVERY 24 HOURS
Qty: 30 PATCH | Refills: 1 | Status: CANCELLED | OUTPATIENT
Start: 2025-06-05

## 2025-06-05 RX ORDER — PREGABALIN 25 MG/1
CAPSULE ORAL
COMMUNITY

## 2025-06-05 RX ORDER — BISOPROLOL FUMARATE 5 MG/1
2.5 TABLET, FILM COATED ORAL DAILY
Qty: 90 TABLET | Refills: 3 | Status: SHIPPED | OUTPATIENT
Start: 2025-06-05

## 2025-06-05 RX ORDER — LISINOPRIL 20 MG/1
20 TABLET ORAL DAILY
Qty: 90 TABLET | Refills: 3 | Status: CANCELLED | OUTPATIENT
Start: 2025-06-05

## 2025-06-05 RX ORDER — ROSUVASTATIN CALCIUM 10 MG/1
10 TABLET, COATED ORAL DAILY
Qty: 90 TABLET | Refills: 3 | Status: SHIPPED | OUTPATIENT
Start: 2025-06-05

## 2025-06-05 RX ORDER — CHOLECALCIFEROL TAB 125 MCG (5000 UNIT) 125 MCG (5000 UT)
1 TAB DAILY
Qty: 90 TABLET | Refills: 3 | Status: SHIPPED | OUTPATIENT
Start: 2025-06-05
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
## 2025-06-11 ENCOUNTER — TELEPHONE (OUTPATIENT)
Dept: CARDIOLOGY | Facility: CLINIC | Age: 55
End: 2025-06-11
Payer: COMMERCIAL

## 2025-06-11 DIAGNOSIS — R07.89 CHEST PRESSURE: ICD-10-CM

## 2025-06-11 DIAGNOSIS — R06.02 SHORTNESS OF BREATH: Primary | ICD-10-CM

## 2025-06-11 NOTE — TELEPHONE ENCOUNTER
[facility] team notified, [facility] has denied nuclear stress test and echo.  Denial letters scanned in media.  They want a treadmill stress first.    And regarding the echo denial they want stress test results first.  We may be able to do treadmill stress first and then resubmit the echo.

## 2025-06-12 ENCOUNTER — TELEPHONE (OUTPATIENT)
Dept: CARDIOLOGY | Facility: CLINIC | Age: 55
End: 2025-06-12
Payer: COMMERCIAL

## 2025-06-12 DIAGNOSIS — F17.200 NICOTINE DEPENDENCE, UNSPECIFIED, UNCOMPLICATED: ICD-10-CM

## 2025-06-12 RX ORDER — NICOTINE 21 MG/24HR
PATCH, TRANSDERMAL 24 HOURS TRANSDERMAL
Qty: 30 PATCH | Refills: 1 | OUTPATIENT
Start: 2025-06-12

## 2025-06-12 NOTE — TELEPHONE ENCOUNTER
Patient called, regarding [employer] denied the nuclear stress test and treadmill stress ordered.    Patient has history of bilateral foot birth defect, numerous surgeries and also impairment due to orthopedic problems with back and would be a fall risk on a treadmill.    Notes for why patient cannot walk on treadmill will need to be added to the assessment and plan of the office note.      (Assessment and plan is where [employer] will always look for why they cannot walk on treadmill documentation)

## 2025-06-13 LAB
INSULIN FREE SERPL-ACNC: 16 UU/ML
INSULIN SERPL-ACNC: 16 UU/ML

## 2025-06-13 NOTE — TELEPHONE ENCOUNTER
Note has been addended.  Do I need to put a new order in or is the current order still valid show monica

## 2025-06-16 NOTE — TELEPHONE ENCOUNTER
I think they can still use the nuclear stress test order.    6/13/25 [employer] team was going to resubmit addended office note to insurance.

## 2025-06-18 ENCOUNTER — CLINICAL SUPPORT (OUTPATIENT)
Dept: CARDIOLOGY | Facility: CLINIC | Age: 55
End: 2025-06-18
Payer: COMMERCIAL

## 2025-06-18 DIAGNOSIS — R00.2 PALPITATIONS: Primary | ICD-10-CM

## 2025-06-18 PROCEDURE — 93000 ELECTROCARDIOGRAM COMPLETE: CPT | Performed by: NURSE PRACTITIONER

## 2025-06-18 NOTE — PROGRESS NOTES
Procedure     ECG 12 Lead    Date/Time: 6/18/2025 5:01 PM  Performed by: La Nena Nava APRN    Authorized by: La Nena Nava APRN  Previous ECG: no previous ECG available  Rhythm: sinus rhythm  Rate: normal  BPM: 62    Clinical impression: normal ECG  Comments: QTc 438 ms

## 2025-06-26 ENCOUNTER — APPOINTMENT (OUTPATIENT)
Dept: CARDIOLOGY | Facility: HOSPITAL | Age: 55
End: 2025-06-26
Payer: COMMERCIAL

## 2025-06-26 ENCOUNTER — HOSPITAL ENCOUNTER (OUTPATIENT)
Dept: CARDIOLOGY | Facility: HOSPITAL | Age: 55
Discharge: HOME | End: 2025-06-26
Payer: COMMERCIAL

## 2025-06-26 DIAGNOSIS — R07.89 CHEST PRESSURE: ICD-10-CM

## 2025-06-26 DIAGNOSIS — R06.02 SHORTNESS OF BREATH: ICD-10-CM

## 2025-06-26 LAB
BH CV REST NUCLEAR ISOTOPE DOSE: 10 MCI
BH CV STRESS COMMENTS STAGE 1: NORMAL
BH CV STRESS DOSE REGADENOSON STAGE 1: 0.4
BH CV STRESS DURATION MIN STAGE 1: 0
BH CV STRESS DURATION SEC STAGE 1: 10
BH CV STRESS NUCLEAR ISOTOPE DOSE: 30 MCI
BH CV STRESS PROTOCOL 1: NORMAL
BH CV STRESS RECOVERY BP: NORMAL MMHG
BH CV STRESS RECOVERY HR: 77 BPM
BH CV STRESS STAGE 1: 1
MAXIMAL PREDICTED HEART RATE: 168 BPM
PERCENT MAX PREDICTED HR: 48.81 %
SPECT HRT GATED+EF W RNC IV: 67 %
STRESS BASELINE BP: NORMAL MMHG
STRESS BASELINE HR: 54 BPM
STRESS PERCENT HR: 57 %
STRESS POST ESTIMATED WORKLOAD: 1 METS
STRESS POST EXERCISE DUR MIN: 2 MIN
STRESS POST PEAK BP: NORMAL MMHG
STRESS POST PEAK HR: 82 BPM
STRESS TARGET HR: 143 BPM

## 2025-06-26 PROCEDURE — 25010000002 REGADENOSON 0.4 MG/5ML SOLUTION: Performed by: INTERNAL MEDICINE

## 2025-06-26 PROCEDURE — 93017 CV STRESS TEST TRACING ONLY: CPT

## 2025-06-26 PROCEDURE — A9500 TC99M SESTAMIBI: HCPCS | Performed by: INTERNAL MEDICINE

## 2025-06-26 PROCEDURE — 78452 HT MUSCLE IMAGE SPECT MULT: CPT

## 2025-06-26 PROCEDURE — 34310000005 TECHNETIUM SESTAMIBI: Performed by: INTERNAL MEDICINE

## 2025-06-26 RX ORDER — REGADENOSON 0.08 MG/ML
0.4 INJECTION, SOLUTION INTRAVENOUS
Status: COMPLETED | OUTPATIENT
Start: 2025-06-26 | End: 2025-06-26

## 2025-06-26 RX ADMIN — REGADENOSON 0.4 MG: 0.08 INJECTION, SOLUTION INTRAVENOUS at 11:36

## 2025-06-26 RX ADMIN — TECHNETIUM TC 99M SESTAMIBI 1 DOSE: 1 INJECTION INTRAVENOUS at 11:36

## 2025-06-26 RX ADMIN — TECHNETIUM TC 99M SESTAMIBI 1 DOSE: 1 INJECTION INTRAVENOUS at 10:08
